# Patient Record
Sex: MALE | Race: WHITE | NOT HISPANIC OR LATINO | Employment: UNEMPLOYED | URBAN - METROPOLITAN AREA
[De-identification: names, ages, dates, MRNs, and addresses within clinical notes are randomized per-mention and may not be internally consistent; named-entity substitution may affect disease eponyms.]

---

## 2022-08-11 ENCOUNTER — HOSPITAL ENCOUNTER (OUTPATIENT)
Dept: GENERAL RADIOLOGY | Facility: HOSPITAL | Age: 62
Discharge: HOME | End: 2022-08-11

## 2022-08-11 ENCOUNTER — TRANSCRIBE ORDERS (OUTPATIENT)
Dept: ADMINISTRATIVE | Facility: HOSPITAL | Age: 62
End: 2022-08-11

## 2022-08-11 DIAGNOSIS — M54.9 DORSAL BACK PAIN: ICD-10-CM

## 2022-08-11 DIAGNOSIS — M54.9 DORSAL BACK PAIN: Primary | ICD-10-CM

## 2022-08-11 PROCEDURE — 72072 X-RAY EXAM THORAC SPINE 3VWS: CPT

## 2022-08-11 PROCEDURE — 72110 X-RAY EXAM L-2 SPINE 4/>VWS: CPT

## 2022-08-11 PROCEDURE — 72050 X-RAY EXAM NECK SPINE 4/5VWS: CPT

## 2022-09-06 NOTE — PROGRESS NOTES
Subjective   History of Present Illness: Kamar Baird is a 61 y.o. male is being seen for consultation today at the request of SHELDON Rodriguez for chronic neck and chronic back pain.  Patient reports a several year history of chronic neck pain and back pain with intermittent radicular symptoms.  In regards to patient's neck pain, his pain is located along his midline cervical spine.  It is worse on movement and patient does suffer from headaches that seem to originate from the posterior aspect of his head.  He reports intermittent numbness and tingling of his fingers mainly noticed after he wakes up.  He describes no arm pain but does get some stiffness along his trapezius musculature radiating into his shoulders.  He describes no weakness.  He denies any gait disturbance or balance issues.  In regards to his back pain, patient's pain is located midline lower lumbar spine that radiates at times outward bilaterally into his musculature.  He describes associated back stiffness.  He does state that he will get some right leg pain very sporadically that seems to shoot down into his toes.  He reports no numbness and tingling.  His pain is worse on sitting and standing.  He has taken no pharmacologic therapy for control of his neck or back pain, he has undergone no physical therapy and adamantly refuses any pain management injection therapy.  He describes no bowel/bladder dysfunction or saddle anesthesia.    Back Pain  This is a chronic problem. The current episode started more than 1 year ago. The problem occurs daily. The pain is present in the lumbar spine. The quality of the pain is described as aching. The pain radiates to the right knee, right foot and right thigh. The symptoms are aggravated by bending, lying down, sitting, standing, position and twisting. Associated symptoms include headaches, leg pain, numbness, tingling and weakness. He has tried analgesics for the symptoms. The treatment provided no  "relief.   Neck Pain   This is a chronic problem. The problem occurs daily. The problem has been unchanged. The pain is associated with nothing. The quality of the pain is described as aching. Associated symptoms include headaches, leg pain, numbness, tingling and weakness. He has tried nothing for the symptoms.       The following portions of the patient's history were reviewed and updated as appropriate: allergies, current medications, past family history, past medical history, past social history, past surgical history and problem list.    Review of Systems   Musculoskeletal: Positive for back pain, myalgias, neck pain and neck stiffness.   Neurological: Positive for tingling, weakness, numbness and headaches.   All other systems reviewed and are negative.      Objective     ./79   Pulse 75   Ht 182.9 cm (72\")   Wt 73.7 kg (162 lb 6.4 oz)   SpO2 97%   BMI 22.03 kg/m²    Body mass index is 22.03 kg/m².      Physical Exam  Neurologic Exam   5/5 muscle strength in bilateral deltoids, biceps, triceps, and hand intrinsics.  2+ tricep reflex left, 1+ triceps right, 2+ bilateral bicep and brachioradialis.  5/5 muscle strength in bilateral iliopsoas, quadriceps, hamstring muscles bilaterally.  5/5 muscle strength with plantar flexion bilaterally.  5/5 muscle strength with dorsi flexion bilaterally.  2+ patellar deep tendon reflexes bilaterally, 2+ ankle jerks. Intact sensation to soft touch in bilateral lower extremities.   Decreased lumbar flexion due to muscle pain/tightness  Decreased lumbar flexion due to midline lumbar pain    Assessment & Plan   Independent Review of Radiographic Studies:      I personally reviewed the images from the following studies.    Cervical, thoracic and lumbar x-rays 8/11/2022    Multilevel spondylitic and degenerative joint changes from mid to lower cervical spine with reversal of lordosis from C3 down.  There is moderate amount of disc space narrowing between C5-C6 and C6-C7.  " Left-sided narrowing at C5-C6 and at C3-C4.    Slight dextrocurvature of thoracic spine.      Lumbar spine demonstrates normal alignment with no subluxation.  Moderate to advanced spondylitic changes at L5-S1 with associated facet arthropathy.    Medical Decision Making:      Kamar Gu a 61 y.o. male that is being seen in the clinic today as a new patient for a chronic history of neck and back pain.  He has no consistent dermatomal pattern for any radicular pain. Patient has undergone no pharmacologic therapy, physical therapy and adamantly refuses any injection therapy.  Patient had no motor weakness or red flags on his exam.  He was not myelopathic.  He has significant amount of multilevel degenerative disc changes as well as facet changes in his cervical spine and primarily along his L5-S1 level in his  lumbar spine.  His presentation mainly consistent with axial neck and back pain but cannot rule out a component of narrowing at the C3-C4 level causing some of his symptoms.  In either case, recommendations remain the same with pharmacologic management as well as physical therapy.  Patient hesitant with both suggestions but has agreed to try it.  We will initiate antiinflammatory and muscle relaxer therapy and patient will be referred for course of formal physical therapy.  We will follow him up in 8 weeks to see how he is progressing.  If at that time, patient has not received significant relief, then further imaging may be warranted.  I encouraged him to call the office with any questions or concerns.    RECOMMENDATIONS FOR NON-OPERATIVE TREATMENT OF SPINE PAIN    Healthy Body Weight    Obtain and maintain a healthy body weight to decrease the stress on your spine. This can be accomplished through a healthy diet and exercise, but some people may need medical or surgical help. Please ask your primary medicine doctor if you need help with weight control.    Regular Exercise    Engage in regular exercise  (vigorous walking, swimming, bicycle, elliptical ) to release endorphins (your body’s natural pain killers) and increase blood flow. This will substantially decrease your pain and speed recovery of the injured tissues.    Avoid Smoking    Avoid smoking which has been associated with accelerated spine degeneration.    Anti-inflammatory Medications    Take non-steroidal anti-inflammatory drugs (NSAIDs) as needed. These are excellent medicines for musculoskeletal (including spinal) pain. These can be purchased at your local store. You do not need a prescription. Two over-the-counter medicines are commonly available (naproxen which is the generic name for “Aleve” or ibuprofen which is the generic name for “Advil” and “Motrin”). It is ok to use the generic or store brand. Ibuprofen can be taken three times a day (600mg - usually 3 pills) or (800mg - usually 4 pills). Naproxen (naprosyn) can be taken twice a day (200mg- usually 1 pill) or (400mg- usually 2 pills). These medications are generally safe, but can cause stomach ulcer bleeding or kidney problems in some patients. If you have concerns regarding the side-effects, please refer to the product label and/or check with your primary medicine doctor.    Non-prescriptive Pain Medications    In addition to the anti-inflammatories, acetaminophen which is the generic for “Tylenol” is a very good pain medication. It can be purchased at your local store. You do not need a prescription. It is generally very safe, but can cause liver problems if taken in excess (over 4000mg per day) or in people with underlying liver disease. If you have concerns regarding the side-effects, please refer to the product label and/or check with your primary medicine doctor.    Avoid Narcotic Pain Medications    In general, it is best to avoid narcotic pain medications which can lead to dependence/addiction and have been associated with an increased sensitivity to pain with long-term use.           Diagnoses and all orders for this visit:    1. Chronic midline low back pain without sciatica (Primary)  -     Ambulatory Referral to Physical Therapy Evaluate and treat    2. Neck pain  -     Ambulatory Referral to Physical Therapy Evaluate and treat    Other orders  -     meloxicam (MOBIC) 15 MG tablet; Take 1 tablet by mouth Every Night.  Dispense: 30 tablet; Refill: 0  -     methocarbamol (ROBAXIN) 750 MG tablet; Take 1 tablet by mouth Every Night.  Dispense: 30 tablet; Refill: 0      Return in about 8 weeks (around 11/2/2022) for Next scheduled follow up.    This patient was examined wearing appropriate personal protective equipment.     Kamar LEWIS Suellenchikis  reports that he has been smoking cigarettes. He has been smoking about 1.00 pack per day. He has never used smokeless tobacco.. I have educated him on the risk of diseases from using tobacco products such as cancer, COPD and heart disease.     I advised him to quit and he is considering quitting but did not commit.    I spent 3  minutes counseling the patient.         Body mass index is 22.03 kg/m².    BMI is within normal parameters. No other follow-up for BMI required.    Patient's blood pressure was reviewed.  Recommendations for  a low-salt diet and exercise to maintain/improve BP in addition to taking any presribed medications.           SHELDON Cintron  09/07/22  14:11 EDT

## 2022-09-07 ENCOUNTER — OFFICE VISIT (OUTPATIENT)
Dept: NEUROSURGERY | Facility: CLINIC | Age: 62
End: 2022-09-07

## 2022-09-07 VITALS
BODY MASS INDEX: 22 KG/M2 | HEIGHT: 72 IN | SYSTOLIC BLOOD PRESSURE: 116 MMHG | WEIGHT: 162.4 LBS | DIASTOLIC BLOOD PRESSURE: 79 MMHG | OXYGEN SATURATION: 97 % | HEART RATE: 75 BPM

## 2022-09-07 DIAGNOSIS — M54.2 NECK PAIN: ICD-10-CM

## 2022-09-07 DIAGNOSIS — G89.29 CHRONIC MIDLINE LOW BACK PAIN WITHOUT SCIATICA: Primary | ICD-10-CM

## 2022-09-07 DIAGNOSIS — M54.50 CHRONIC MIDLINE LOW BACK PAIN WITHOUT SCIATICA: Primary | ICD-10-CM

## 2022-09-07 PROCEDURE — 99204 OFFICE O/P NEW MOD 45 MIN: CPT | Performed by: NURSE PRACTITIONER

## 2022-09-07 RX ORDER — METHOCARBAMOL 750 MG/1
750 TABLET, FILM COATED ORAL NIGHTLY
Qty: 30 TABLET | Refills: 0 | Status: SHIPPED | OUTPATIENT
Start: 2022-09-07

## 2022-09-07 RX ORDER — MELOXICAM 15 MG/1
15 TABLET ORAL NIGHTLY
Qty: 30 TABLET | Refills: 0 | Status: SHIPPED | OUTPATIENT
Start: 2022-09-07 | End: 2022-10-05

## 2022-10-05 RX ORDER — MELOXICAM 15 MG/1
TABLET ORAL
Qty: 30 TABLET | Refills: 0 | Status: SHIPPED | OUTPATIENT
Start: 2022-10-05

## 2022-10-05 NOTE — TELEPHONE ENCOUNTER
Rx Refill Note  Requested Prescriptions     Pending Prescriptions Disp Refills   • meloxicam (MOBIC) 15 MG tablet [Pharmacy Med Name: MELOXICAM 15 MG TABLET] 30 tablet 0     Sig: TAKE 1 TABLET BY MOUTH EVERY DAY AT NIGHT      Last office visit with prescribing clinician: 9/7/2022      Next office visit with prescribing clinician: 11/2/2022             Rossy Hart MA  10/05/22, 08:52 EDT

## 2022-11-02 ENCOUNTER — TELEPHONE (OUTPATIENT)
Dept: NEUROSURGERY | Facility: CLINIC | Age: 62
End: 2022-11-02

## 2022-11-02 NOTE — TELEPHONE ENCOUNTER
Left a voicemail for patient stating that we can get him Rescheduled for an Appointment after he completes the Physical Therapy that was ordered on 9/7/2022 per Jana Mccallum. I also told him if he has any questions regarding this to please call us here at the office.

## 2022-11-02 NOTE — TELEPHONE ENCOUNTER
Called patient and left a message on  11/1/2022 asking him if he completed his Physical Therapy and if so where did he complete that at .

## 2025-02-17 ENCOUNTER — TRANSCRIBE ORDERS (OUTPATIENT)
Dept: ADMINISTRATIVE | Facility: HOSPITAL | Age: 65
End: 2025-02-17
Payer: MEDICAID

## 2025-02-17 ENCOUNTER — HOSPITAL ENCOUNTER (OUTPATIENT)
Dept: GENERAL RADIOLOGY | Facility: HOSPITAL | Age: 65
Discharge: HOME | End: 2025-02-17

## 2025-02-17 DIAGNOSIS — Z02.71 ENCOUNTER FOR DISABILITY DETERMINATION: Primary | ICD-10-CM

## 2025-02-17 DIAGNOSIS — Z02.71 ENCOUNTER FOR DISABILITY DETERMINATION: ICD-10-CM

## 2025-02-17 PROCEDURE — 72100 X-RAY EXAM L-S SPINE 2/3 VWS: CPT

## 2025-02-24 ENCOUNTER — TRANSCRIBE ORDERS (OUTPATIENT)
Dept: ADMINISTRATIVE | Facility: HOSPITAL | Age: 65
End: 2025-02-24
Payer: MEDICAID

## 2025-02-24 DIAGNOSIS — M67.441 GANGLION OF RIGHT HAND: Primary | ICD-10-CM

## 2025-03-01 ENCOUNTER — HOSPITAL ENCOUNTER (OUTPATIENT)
Dept: ULTRASOUND IMAGING | Facility: HOSPITAL | Age: 65
Discharge: HOME | End: 2025-03-01
Payer: MEDICAID

## 2025-03-01 DIAGNOSIS — M67.441 GANGLION OF RIGHT HAND: ICD-10-CM

## 2025-03-01 PROCEDURE — 76882 US LMTD JT/FCL EVL NVASC XTR: CPT

## 2025-05-08 ENCOUNTER — OFFICE VISIT (OUTPATIENT)
Dept: ORTHOPEDIC SURGERY | Facility: CLINIC | Age: 65
End: 2025-05-08
Payer: MEDICAID

## 2025-05-08 VITALS — WEIGHT: 158 LBS | BODY MASS INDEX: 21.4 KG/M2 | HEART RATE: 61 BPM | HEIGHT: 72 IN | OXYGEN SATURATION: 97 %

## 2025-05-08 DIAGNOSIS — L72.3 SEBACEOUS CYST: Primary | ICD-10-CM

## 2025-05-08 PROCEDURE — 1159F MED LIST DOCD IN RCRD: CPT | Performed by: FAMILY MEDICINE

## 2025-05-08 PROCEDURE — 99203 OFFICE O/P NEW LOW 30 MIN: CPT | Performed by: FAMILY MEDICINE

## 2025-05-08 PROCEDURE — 1160F RVW MEDS BY RX/DR IN RCRD: CPT | Performed by: FAMILY MEDICINE

## 2025-05-09 ENCOUNTER — PATIENT ROUNDING (BHMG ONLY) (OUTPATIENT)
Dept: ORTHOPEDIC SURGERY | Facility: CLINIC | Age: 65
End: 2025-05-09
Payer: MEDICAID

## 2025-05-09 NOTE — PROGRESS NOTES
"Primary Care Sports Medicine Office Visit Note    Patient ID: Kamar Baird is a 64 y.o. male.    Chief Complaint:  Chief Complaint   Patient presents with   • Right Hand - Pain, Initial Evaluation     Ganglion cyst Has been there for a couple years   Not painful        History of Present Illness  The patient presents for evaluation of a cyst on his hand.    He has been experiencing the presence of a cyst on his hand for several years. Initially, he suspected it to be a metal splinter due to its small size and sharp nature. However, an x-ray examination revealed no such foreign body. An ultrasound was performed at [facility] on 03/01/2025, but the results were not communicated to him. He attempted to mona the cyst himself, with the assistance of his son, which resulted in the discharge of clear fluid. He also reports a past incident of cutting his hand with a piece of metal.       Past Medical History:   Diagnosis Date   • Skin cancer        Past Surgical History:   Procedure Laterality Date   • FINGER SURGERY Left     left pinky finger       Family History   Problem Relation Age of Onset   • Cancer Mother    • Diabetes Father      Social History     Occupational History   • Not on file   Tobacco Use   • Smoking status: Every Day     Current packs/day: 1.00     Types: Cigarettes   • Smokeless tobacco: Never   Vaping Use   • Vaping status: Never Used   Substance and Sexual Activity   • Alcohol use: Never   • Drug use: Never   • Sexual activity: Defer        Review of Systems:  Review of Systems   Constitutional:  Negative for activity change, fatigue and fever.   Musculoskeletal:  Positive for arthralgias.   Skin:  Negative for color change and rash.   Neurological:  Negative for numbness.     Objective:  Physical Exam  Pulse 61   Ht 182.9 cm (72\")   Wt 71.7 kg (158 lb)   SpO2 97%   BMI 21.43 kg/m²   Vitals and nursing note reviewed.   Constitutional:       General: he  is not in acute " distress.     Appearance: he is well-developed. he is not diaphoretic.   HENT:      Head: Normocephalic and atraumatic.   Eyes:      Conjunctiva/sclera: Conjunctivae normal.   Pulmonary:      Effort: Pulmonary effort is normal. No respiratory distress.   Skin:     General: Skin is warm.      Capillary Refill: Capillary refill takes less than 2 seconds.   Neurological:      Mental Status: he is alert.     Ortho Exam:  Physical Exam  Extremities: Right hand evaluation reveals a 1 cm by 0.5 cm in length and width and at minimum 0.5 cm deep fluctuant taut lesion overlying the volar aspect of the first metacarpophalangeal joint. This is nontender, nonwarm, nonerythematous. Flexion, extension, abduction, adduction of the thumb are full and 5/5 in strength. Distal most tip of the first digit is neurovascularly intact.    Results  Imaging   - X-ray of the hand: No foreign body      Assessment & Plan  1. Sebaceous cyst.  - The patient has a ganglion cyst on the volar aspect of the first metacarpophalangeal joint of the right hand, present for a couple of years.  - Right hand evaluation reveals a 1 cm by half centimeter in length and width and at minimum half centimeter deep fluctuant taut lesion overlying the volar aspect of the first metacarpophalangeal joint. This is nontender, nonwarm, nonerythematous. Flexion, extension, abduction, adduction of the thumb are full and five slash five in strength. Distal most tip of the first digit is neurovascularly intact.  - An ultrasound was performed on 03/01/2025 at [facility], but the results were not communicated.  - After ultrasound evaluation and aspiration, sebaceous material was removed making this much more likely a sebaceous cyst, then previously suspected ganglion.  We discussed with aspiration/laceration removal of sebaceous material, the sac remains.  Should this refill with fluid again, he can return for mildly more invasive procedure to remove the  "entirety of the sac to ensure resolution.  Patient verbalized understanding and will return if accumulation happens again.  Incision & Drainage    Date/Time: 5/9/2025 9:37 AM    Performed by: Miles Madera II, DO  Authorized by: Miles Madera II, DO  Type: cyst  Body area: upper extremity  Location details: right thumb  Anesthesia: local infiltration    Anesthesia:  Local Anesthetic: lidocaine 1% without epinephrine (3)    Sedation:  Patient sedated: no    Scalpel size: 15  Incision type: single straight  Drainage characteristics: sebaceous.  Drainage amount: moderate  Wound treatment: wound left open  Packing material: none  Patient tolerance: patient tolerated the procedure well with no immediate complications        Miles NYE \"Chance\" Santy MORENO DO, CAQSM  05/09/25  09:35 EDT    Disclaimer: Please note that areas of this note were completed with computer voice recognition software.  Quite often unanticipated grammatical, syntax, homophones, and other interpretive errors are inadvertently transcribed by the computer software. Please excuse any errors that have escaped final proofreading.    Patient or patient representative verbalized consent for the use of Ambient Listening during the visit with  Miles Madera II, DO for chart documentation. 09:35 EDT 05/09/25   "